# Patient Record
Sex: FEMALE | Race: WHITE | Employment: FULL TIME | URBAN - METROPOLITAN AREA
[De-identification: names, ages, dates, MRNs, and addresses within clinical notes are randomized per-mention and may not be internally consistent; named-entity substitution may affect disease eponyms.]

---

## 2023-07-10 ENCOUNTER — OFFICE VISIT (OUTPATIENT)
Dept: SPORTS MEDICINE | Age: 48
End: 2023-07-10
Payer: COMMERCIAL

## 2023-07-10 VITALS — BODY MASS INDEX: 23.3 KG/M2 | HEIGHT: 66 IN | WEIGHT: 145 LBS | TEMPERATURE: 97.6 F

## 2023-07-10 DIAGNOSIS — M47.26 OSTEOARTHRITIS OF SPINE WITH RADICULOPATHY, LUMBAR REGION: Primary | ICD-10-CM

## 2023-07-10 PROCEDURE — G8420 CALC BMI NORM PARAMETERS: HCPCS | Performed by: FAMILY MEDICINE

## 2023-07-10 PROCEDURE — 99213 OFFICE O/P EST LOW 20 MIN: CPT | Performed by: FAMILY MEDICINE

## 2023-07-10 PROCEDURE — 1036F TOBACCO NON-USER: CPT | Performed by: FAMILY MEDICINE

## 2023-07-10 PROCEDURE — G8427 DOCREV CUR MEDS BY ELIG CLIN: HCPCS | Performed by: FAMILY MEDICINE

## 2023-07-10 ASSESSMENT — ENCOUNTER SYMPTOMS
DIARRHEA: 0
NAUSEA: 0
BACK PAIN: 0
CONSTIPATION: 0
SHORTNESS OF BREATH: 0

## 2023-07-10 NOTE — PROGRESS NOTES
[facility]) Physicians  Neurosurgery and Pain [facility]  [address], [address] [place]: [phone]  F: [phone]      Raciel Sow  (1975)    7/10/2023    Subjective: Raciel Sow is 50 y.o. female who complains today of:    Chief Complaint   Patient presents with    Follow-up    Back Pain     Osteoarthritis of spine with radiculopathy, lumbar region    Knee Pain     Primary osteoarthritis of right knee       HPI     This patient returns stating that she is feeling a lot better since she still has a little pain running but other than that she is doing everything without pain  Allergies:  Pine tar and Hydrocodone-acetaminophen    Past Medical History:   Diagnosis Date    Depression     Hypertension      Past Surgical History:   Procedure Laterality Date    THYROID SURGERY       Family History   Problem Relation Age of Onset    Hypertension Mother      Social History     Socioeconomic History    Marital status:      Spouse name: Not on file    Number of children: Not on file    Years of education: Not on file    Highest education level: Not on file   Occupational History    Not on file   Tobacco Use    Smoking status: Never    Smokeless tobacco: Never   Substance and Sexual Activity    Alcohol use:  Yes     Alcohol/week: 4.0 - 6.0 standard drinks     Types: 2 - 3 Glasses of wine, 2 - 3 Cans of beer per week    Drug use: Never    Sexual activity: Not on file   Other Topics Concern    Not on file   Social History Narrative    Not on file     Social Determinants of Health     Financial Resource Strain: Not on file   Food Insecurity: Not on file   Transportation Needs: Not on file   Physical Activity: Not on file   Stress: Not on file   Social Connections: Not on file   Intimate Partner Violence: Not on file   Housing Stability: Not on file       Current Outpatient Medications on File Prior to Visit   Medication Sig Dispense Refill

## 2023-10-09 ENCOUNTER — OFFICE VISIT (OUTPATIENT)
Dept: PRIMARY CARE | Facility: CLINIC | Age: 48
End: 2023-10-09
Payer: COMMERCIAL

## 2023-10-09 VITALS
WEIGHT: 151 LBS | HEART RATE: 52 BPM | BODY MASS INDEX: 24.27 KG/M2 | DIASTOLIC BLOOD PRESSURE: 71 MMHG | TEMPERATURE: 97.5 F | HEIGHT: 66 IN | SYSTOLIC BLOOD PRESSURE: 106 MMHG

## 2023-10-09 DIAGNOSIS — L53.9 FACIAL ERYTHEMA: Primary | ICD-10-CM

## 2023-10-09 PROCEDURE — 1036F TOBACCO NON-USER: CPT | Performed by: FAMILY MEDICINE

## 2023-10-09 PROCEDURE — 99213 OFFICE O/P EST LOW 20 MIN: CPT | Performed by: FAMILY MEDICINE

## 2023-10-09 RX ORDER — TRIAMCINOLONE ACETONIDE 1 MG/G
CREAM TOPICAL 2 TIMES DAILY
Qty: 28.4 G | Refills: 0 | Status: SHIPPED | OUTPATIENT
Start: 2023-10-09

## 2023-10-09 RX ORDER — ESCITALOPRAM OXALATE 5 MG/1
1 TABLET ORAL DAILY
COMMUNITY
Start: 2021-08-25

## 2023-10-09 RX ORDER — ESTRADIOL 0.05 MG/D
1 FILM, EXTENDED RELEASE TRANSDERMAL
COMMUNITY
Start: 2023-06-02

## 2023-10-09 RX ORDER — LOSARTAN POTASSIUM AND HYDROCHLOROTHIAZIDE 12.5; 5 MG/1; MG/1
1 TABLET ORAL DAILY
COMMUNITY

## 2023-10-09 ASSESSMENT — ENCOUNTER SYMPTOMS
FATIGUE: 0
FACIAL SWELLING: 1
FEVER: 0
SORE THROAT: 0
RHINORRHEA: 0
SINUS PRESSURE: 0
EYE DISCHARGE: 0
SINUS PAIN: 0
HEADACHES: 0
COUGH: 0
CHILLS: 1

## 2023-10-09 ASSESSMENT — PATIENT HEALTH QUESTIONNAIRE - PHQ9
SUM OF ALL RESPONSES TO PHQ9 QUESTIONS 1 AND 2: 0
1. LITTLE INTEREST OR PLEASURE IN DOING THINGS: NOT AT ALL
2. FEELING DOWN, DEPRESSED OR HOPELESS: NOT AT ALL

## 2023-10-09 NOTE — PROGRESS NOTES
"Subjective   Patient ID: Sarika Jiang is a 48 y.o. female who presents for Facial Pain and Facial Swelling (Left cheek irritation. ).    HPI   Yeterday pm, face felt sore, like \"sunburn\".  Was outside over the weekend for a number of hours, thought perhaps it was windburn.  L sided facial pain, swelling upon wakening this am.  No rash.  Chills but no fever.  No new lotions, soaps, detergents.  No new foods.  Review of Systems   Constitutional:  Positive for chills. Negative for fatigue and fever.   HENT:  Positive for facial swelling. Negative for congestion, ear pain, rhinorrhea, sinus pressure, sinus pain, sneezing and sore throat.    Eyes:  Negative for discharge and visual disturbance.   Respiratory:  Negative for cough.    Neurological:  Negative for headaches.       Objective   /71 (BP Location: Left arm, Patient Position: Sitting)   Pulse 52   Temp 36.4 °C (97.5 °F)   Ht 1.676 m (5' 6\")   Wt 68.5 kg (151 lb)   BMI 24.37 kg/m²     Physical Exam  Constitutional:       Appearance: Normal appearance.   HENT:      Head:        Comments: Mild erythema and edema along left cheek extending to lateral of the left eye.     Right Ear: Tympanic membrane and ear canal normal.      Left Ear: Tympanic membrane and ear canal normal.      Nose: Nose normal.      Mouth/Throat:      Mouth: Mucous membranes are moist.      Pharynx: Oropharynx is clear.   Eyes:      General:         Right eye: No discharge.         Left eye: Discharge present.     Extraocular Movements: Extraocular movements intact.      Conjunctiva/sclera: Conjunctivae normal.      Pupils: Pupils are equal, round, and reactive to light.   Cardiovascular:      Rate and Rhythm: Normal rate and regular rhythm.      Pulses: Normal pulses.   Pulmonary:      Effort: Pulmonary effort is normal.      Breath sounds: Normal breath sounds.   Musculoskeletal:      Cervical back: Normal range of motion and neck supple.   Lymphadenopathy:      Cervical: " No cervical adenopathy.   Neurological:      Mental Status: She is alert.         Assessment/Plan   Diagnoses and all orders for this visit:  Facial erythema  -     triamcinolone (Kenalog) 0.1 % cream; Apply topically 2 times a day.  No evidence of vesicular rash, but patient advised to contact office if this changes as she may need antiviral therapy.  Call for any acute changes in symptoms.